# Patient Record
Sex: MALE | Race: WHITE | NOT HISPANIC OR LATINO | Employment: FULL TIME | URBAN - METROPOLITAN AREA
[De-identification: names, ages, dates, MRNs, and addresses within clinical notes are randomized per-mention and may not be internally consistent; named-entity substitution may affect disease eponyms.]

---

## 2018-05-18 ENCOUNTER — APPOINTMENT (OUTPATIENT)
Dept: RADIOLOGY | Facility: MEDICAL CENTER | Age: 53
End: 2018-05-18
Attending: INTERNAL MEDICINE
Payer: COMMERCIAL

## 2018-09-21 RX ORDER — BENAZEPRIL HYDROCHLORIDE AND HYDROCHLOROTHIAZIDE 20; 12.5 MG/1; MG/1
1 TABLET ORAL DAILY
Status: ON HOLD | COMMUNITY
End: 2022-04-13

## 2018-10-04 ENCOUNTER — HOSPITAL ENCOUNTER (OUTPATIENT)
Facility: MEDICAL CENTER | Age: 53
End: 2018-10-04
Attending: INTERNAL MEDICINE | Admitting: RADIOLOGY
Payer: COMMERCIAL

## 2018-10-04 ENCOUNTER — APPOINTMENT (OUTPATIENT)
Dept: RADIOLOGY | Facility: MEDICAL CENTER | Age: 53
End: 2018-10-04
Attending: INTERNAL MEDICINE
Payer: COMMERCIAL

## 2018-10-04 VITALS
DIASTOLIC BLOOD PRESSURE: 63 MMHG | HEIGHT: 68 IN | BODY MASS INDEX: 37.13 KG/M2 | HEART RATE: 66 BPM | RESPIRATION RATE: 15 BRPM | SYSTOLIC BLOOD PRESSURE: 119 MMHG | OXYGEN SATURATION: 99 % | WEIGHT: 245 LBS

## 2018-10-04 DIAGNOSIS — R94.5 NONSPECIFIC ABNORMAL RESULTS OF LIVER FUNCTION STUDY: ICD-10-CM

## 2018-10-04 DIAGNOSIS — K76.0 NONALCOHOLIC FATTY LIVER DISEASE: ICD-10-CM

## 2018-10-04 DIAGNOSIS — R77.8 ELEVATED TOTAL PROTEIN: ICD-10-CM

## 2018-10-04 DIAGNOSIS — R79.89 LIVER FUNCTION TEST ABNORMALITY: ICD-10-CM

## 2018-10-04 DIAGNOSIS — K76.0 FATTY METAMORPHOSIS OF LIVER: ICD-10-CM

## 2018-10-04 DIAGNOSIS — R79.89 HIGH SERUM FERRITIN: ICD-10-CM

## 2018-10-04 DIAGNOSIS — Z91.89 AT RISK FOR BLEEDING: ICD-10-CM

## 2018-10-04 PROCEDURE — 47000 NEEDLE BIOPSY OF LIVER PERQ: CPT

## 2018-10-04 PROCEDURE — 83540 ASSAY OF IRON: CPT

## 2018-10-04 PROCEDURE — 160002 HCHG RECOVERY MINUTES (STAT)

## 2018-10-04 PROCEDURE — 700101 HCHG RX REV CODE 250

## 2018-10-04 PROCEDURE — 88313 SPECIAL STAINS GROUP 2: CPT

## 2018-10-04 PROCEDURE — 700111 HCHG RX REV CODE 636 W/ 250 OVERRIDE (IP)

## 2018-10-04 PROCEDURE — 88307 TISSUE EXAM BY PATHOLOGIST: CPT

## 2018-10-04 RX ORDER — SODIUM CHLORIDE 9 MG/ML
500 INJECTION, SOLUTION INTRAVENOUS
Status: DISCONTINUED | OUTPATIENT
Start: 2018-10-04 | End: 2018-10-04 | Stop reason: HOSPADM

## 2018-10-04 RX ORDER — OXYCODONE HYDROCHLORIDE 5 MG/1
2.5 TABLET ORAL
Status: DISCONTINUED | OUTPATIENT
Start: 2018-10-04 | End: 2018-10-04 | Stop reason: HOSPADM

## 2018-10-04 RX ORDER — MIDAZOLAM HYDROCHLORIDE 1 MG/ML
.5-2 INJECTION INTRAMUSCULAR; INTRAVENOUS PRN
Status: DISCONTINUED | OUTPATIENT
Start: 2018-10-04 | End: 2018-10-04 | Stop reason: HOSPADM

## 2018-10-04 RX ORDER — MIDAZOLAM HYDROCHLORIDE 1 MG/ML
INJECTION INTRAMUSCULAR; INTRAVENOUS
Status: COMPLETED
Start: 2018-10-04 | End: 2018-10-04

## 2018-10-04 RX ORDER — LIDOCAINE HYDROCHLORIDE 20 MG/ML
INJECTION, SOLUTION INFILTRATION; PERINEURAL
Status: COMPLETED
Start: 2018-10-04 | End: 2018-10-04

## 2018-10-04 RX ORDER — OXYCODONE HYDROCHLORIDE 5 MG/1
5 TABLET ORAL
Status: DISCONTINUED | OUTPATIENT
Start: 2018-10-04 | End: 2018-10-04 | Stop reason: HOSPADM

## 2018-10-04 RX ORDER — ONDANSETRON 2 MG/ML
4 INJECTION INTRAMUSCULAR; INTRAVENOUS PRN
Status: DISCONTINUED | OUTPATIENT
Start: 2018-10-04 | End: 2018-10-04 | Stop reason: HOSPADM

## 2018-10-04 RX ADMIN — MIDAZOLAM 2 MG: 1 INJECTION INTRAMUSCULAR; INTRAVENOUS at 08:19

## 2018-10-04 RX ADMIN — MIDAZOLAM 2 MG: 1 INJECTION INTRAMUSCULAR; INTRAVENOUS at 08:54

## 2018-10-04 RX ADMIN — LIDOCAINE HYDROCHLORIDE: 20 INJECTION, SOLUTION INFILTRATION; PERINEURAL at 08:45

## 2018-10-04 RX ADMIN — FENTANYL CITRATE 50 MCG: 50 INJECTION, SOLUTION INTRAMUSCULAR; INTRAVENOUS at 08:52

## 2018-10-04 RX ADMIN — FENTANYL CITRATE 50 MCG: 50 INJECTION, SOLUTION INTRAMUSCULAR; INTRAVENOUS at 08:19

## 2018-10-04 RX ADMIN — MIDAZOLAM HYDROCHLORIDE 2 MG: 1 INJECTION INTRAMUSCULAR; INTRAVENOUS at 08:25

## 2018-10-04 RX ADMIN — MIDAZOLAM HYDROCHLORIDE 2 MG: 1 INJECTION INTRAMUSCULAR; INTRAVENOUS at 08:54

## 2018-10-04 RX ADMIN — MIDAZOLAM HYDROCHLORIDE 2 MG: 1 INJECTION INTRAMUSCULAR; INTRAVENOUS at 08:19

## 2018-10-04 ASSESSMENT — PAIN SCALES - GENERAL: PAINLEVEL_OUTOF10: 0

## 2018-10-04 NOTE — PROGRESS NOTES
OP IR RN note:    Site Marked and Confirmed with MD, patient and RN pre procedure   Liver BX by MD Sutton assisted by RT Aaron, Right abdomen access site;  x2 liver cores in formalinm  End tidal CO2 range 28-33 during procedure   Patient tolerated procedure, hemodynamically stable; pt awake and talking post procedure; see flow sheet for vitals and post op print out    patient in OP IR pod 1 via IR RN monitored          Pt to lay on right side for 1 hour with Q15 RN site checks then to lay on back for additional hour with site checks   x2 cores in [place] sent to lab

## 2018-10-04 NOTE — H&P
History and Physical    Date: 10/4/2018    PCP: Prakash Maldonado D.O.      CC: Abnormal liver function    HPI: This is a 52 y.o. male who is presenting Abnormal Liver function    Past Medical History:   Diagnosis Date   • Allergy     seasonal   • Hypertension        Past Surgical History:   Procedure Laterality Date   • TIBIAL OSTEOTOMY Left    • TONSILLECTOMY         Current Facility-Administered Medications   Medication Dose Route Frequency Provider Last Rate Last Dose   • LIDOCAINE HCL 2 % INJ SOLN            • MIDAZOLAM HCL 2 MG/2ML INJ SOLN            • FENTANYL CITRATE (PF) 0.05 MG/ML INJ SOLN                 Social History     Social History   • Marital status:      Spouse name: N/A   • Number of children: N/A   • Years of education: N/A     Occupational History   • Not on file.     Social History Main Topics   • Smoking status: Former Smoker     Types: Cigars     Quit date: 2014   • Smokeless tobacco: Former User     Types: Chew     Quit date: 1995   • Alcohol use 0.6 oz/week     1 Shots of liquor per week   • Drug use: No   • Sexual activity: Not on file     Other Topics Concern   • Not on file     Social History Narrative   • No narrative on file       Family History   Problem Relation Age of Onset   • Hypertension Mother        Allergies:  Patient has no known allergies.    Review of Systems:  Abnormal liver function    Physical Exam    Vital Signs                           Labs:                    Radiology:  IR-LIVER BX PROCEDURE    (Results Pending)             Assessment and Plan:This is a 52 y.o. Abnormal liver functio..    Plan: USG guided liver Bx

## 2018-10-04 NOTE — DISCHARGE INSTRUCTIONS
Discharge instructions reviewed with patient and family, all questions answered. IV removed and belongings returned.  Patient ambulated out in a stable condition.       ACTIVITY: Rest and take it easy for the first 24 hours.  A responsible adult is recommended to remain with you during that time.  It is normal to feel sleepy.  We encourage you to not do anything that requires balance, judgment or coordination.    MILD FLU-LIKE SYMPTOMS ARE NORMAL. YOU MAY EXPERIENCE GENERALIZED MUSCLE ACHES, THROAT IRRITATION, HEADACHE AND/OR SOME NAUSEA.    FOR 24 HOURS DO NOT:  Drive, operate machinery or run household appliances.  Drink beer or alcoholic beverages.   Make important decisions or sign legal documents.    SPECIAL INSTRUCTIONS:  Keep surgical site clean dry and covered until fully healed. Do not lift over 10 lbs for 2 week      DIET: To avoid nausea, slowly advance diet as tolerated, avoiding spicy or greasy foods for the first day.  Add more substantial food to your diet according to your physician's instructions.  Babies can be fed formula or breast milk as soon as they are hungry.  INCREASE FLUIDS AND FIBER TO AVOID CONSTIPATION.    SURGICAL DRESSING/BATHING: Keep surgical site clean dry and covered until fully healed.    FOLLOW-UP APPOINTMENT:  A follow-up appointment should be arranged with your doctor in 1 week; call to schedule.    You should CALL YOUR PHYSICIAN if you develop:  Fever greater than 101 degrees F.  Pain not relieved by medication, or persistent nausea or vomiting.  Excessive bleeding (blood soaking through dressing) or unexpected drainage from the wound.  Extreme redness or swelling around the incision site, drainage of pus or foul smelling drainage.  Inability to urinate or empty your bladder within 8 hours.  Problems with breathing or chest pain.    You should call 911 if you develop problems with breathing or chest pain.  If you are unable to contact your doctor or surgical center, you should  go to the nearest emergency room or urgent care center.     If any questions arise, call your doctor.  If your doctor is not available, please feel free to call the Surgical Center at [phone].  The Center is open Monday through Friday from 7AM to 7PM.  You can also call the HEALTH HOTLINE open 24 hours/day, 7 days/week and speak to a nurse at [phone], or toll free at [phone].    A registered nurse may call you a few days after your surgery to see how you are doing after your procedure.    MEDICATIONS: Resume taking daily medication.  Take prescribed pain medication with food.  If no medication is prescribed, you may take non-aspirin pain medication if needed.  PAIN MEDICATION CAN BE VERY CONSTIPATING.  Take a stool softener or laxative such as senokot, pericolace, or milk of magnesia if needed.      If your physician has prescribed pain medication that includes Acetaminophen (Tylenol), do not take additional Acetaminophen (Tylenol) while taking the prescribed medication.    Depression / Suicide Risk    As you are discharged from this [facility] facility, it is important to learn how to keep safe from harming yourself.    Recognize the warning signs:  · Abrupt changes in personality, positive or negative- including increase in energy   · Giving away possessions  · Change in eating patterns- significant weight changes-  positive or negative  · Change in sleeping patterns- unable to sleep or sleeping all the time   · Unwillingness or inability to communicate  · Depression  · Unusual sadness, discouragement and loneliness  · Talk of wanting to die  · Neglect of personal appearance   · Rebelliousness- reckless behavior  · Withdrawal from people/activities they love  · Confusion- inability to concentrate     If you or a loved one observes any of these behaviors or has concerns about self-harm, here's what you can do:  · Talk about it- your feelings and reasons for harming yourself  · Remove any  means that you might use to hurt yourself (examples: pills, rope, extension cords, firearm)  · Get professional help from the community (Mental Health, Substance Abuse, psychological counseling)  · Do not be alone:Call your Safe Contact- someone whom you trust who will be there for you.  · Call your local CRISIS HOTLINE 178-2479 or 545-799-7999  · Call your local Children's Mobile Crisis Response Team [place] (524) 808-2418 or wwwInstagarage  · Call the toll free National Suicide Prevention Hotlines   · National Suicide Prevention Lifeline 214-782-MIBI (0569)  · National Hope Line Network 800-SUICIDE (959-3639)      Liver Biopsy, Care After  Refer to this sheet in the next few weeks. These instructions provide you with information on caring for yourself after your procedure. Your health care provider may also give you more specific instructions. Your treatment has been planned according to current medical practices, but problems sometimes occur. Call your health care provider if you have any problems or questions after your procedure.  WHAT TO EXPECT AFTER THE PROCEDURE  After your procedure, it is typical to have the following:  · A small amount of discomfort in the area where the biopsy was done and in the right shoulder or shoulder blade.  · A small amount of bruising around the area where the biopsy was done and on the skin over the liver.  · Sleepiness and fatigue for the rest of the day.  HOME CARE INSTRUCTIONS   · Rest at home for 1-2 days or as directed by your health care provider.  · Have a friend or family member stay with you for at least 24 hours.  · Because of the medicines used during the procedure, you should not do the following things in the first 24 hours:  ¨ Drive.  ¨ Use machinery.  ¨ Be responsible for the care of other people.  ¨ Sign legal documents.  ¨ Take a bath or shower.  · There are many different ways to close and cover an incision, including stitches, skin glue, and  adhesive strips. Follow your health care provider's instructions on:  ¨ Incision care.  ¨ Bandage (dressing) changes and removal.  ¨ Incision closure removal.  · Do not drink alcohol in the first week.  · Do not lift more than 5 pounds or play contact sports for 2 weeks after this test.  · Take medicines only as directed by your health care provider. Do not take medicine containing aspirin or non-steroidal anti-inflammatory medicines such as ibuprofen for 1 week after this test.  · It is your responsibility to get your test results.  SEEK MEDICAL CARE IF:   · You have increased bleeding from an incision that results in more than a small spot of blood.  · You have redness, swelling, or increasing pain in any incisions.  · You notice a discharge or a bad smell coming from any of your incisions.  · You have a fever or chills.  SEEK IMMEDIATE MEDICAL CARE IF:   · You develop swelling, bloating, or pain in your abdomen.  · You become dizzy or faint.  · You develop a rash.  · You are nauseous or vomit.  · You have difficulty breathing, feel short of breath, or feel faint.  · You develop chest pain.  · You have problems with your speech or vision.  · You have trouble balancing or moving your arms or legs.     This information is not intended to replace advice given to you by your health care provider. Make sure you discuss any questions you have with your health care provider.     Document Released: 07/07/2006 Document Revised: 01/08/2016 Document Reviewed: 02/13/2015  [employer] Interactive Patient Education ©2016 [employer]      Liver Biopsy  The liver is a large organ in the upper right-hand side of your abdomen. A liver biopsy is a procedure in which a tissue sample is taken from the liver and examined under a microscope. The procedure is done to confirm a suspected problem.  There are three types of liver biopsies:  · Percutaneous. In this type, an incision is made in your abdomen. The sample is removed through the  incision with a needle.  · Laparoscopic. In this type, several incisions are made in the abdomen. A tiny camera is passed through one of the incisions to help guide the health care provider. The sample is removed through the other incision or incisions.  · Transjugular. In this type, an incision is made in the neck. A tube is passed through the incision to the liver. The sample is removed through the tube with a needle.  Tell a health care provider about:  · Any allergies you have.  · All medicines you are taking, including vitamins, herbs, eye drops, creams, and over-the-counter medicines.  · Any problems you or family members have had with anesthetic medicines.  · Any blood disorders you have.  · Any surgeries you have had.  · Any medical conditions you have.  · Possibility of pregnancy, if this applies.  What are the risks?  Generally, this is a safe procedure. However, problems can occur and include:  · Bleeding.  · Infection.  · Bruising.  · Collapsed lung.  · Leak of digestive juices (bile) from the liver or gallbladder.  · Problems with heart rhythm.  · Pain at the biopsy site or in the right shoulder.  · Low blood pressure (hypotension).  · Injury to nearby organs or tissues.  What happens before the procedure?  · Your health care provider may do some blood or urine tests. These will help your health care provider learn how well your kidneys and liver are working and how well your blood clots.  · Ask your health care provider if you will be able to go home the day of the procedure. Arrange for someone to take you home and stay with you for at least 24 hours.  · Do not eat or drink anything after midnight on the night before the procedure or as directed by your health care provider.  · Ask your health care provider about:  ¨ Changing or stopping your regular medicines. This is especially important if you are taking diabetes medicines or blood thinners.  ¨ Taking medicines such as aspirin and ibuprofen. These  medicines can thin your blood. Do not take these medicines before your procedure if your health care provider asks you not to.  What happens during the procedure?  Regardless of the type of biopsy that will be done, you will have an IV line placed. Through this line, you will receive fluids and medicine to relax you. If you will be having a laparoscopic biopsy, you may also receive medicine through this line to make you sleep during the procedure (general anesthetic).  Percutaneous Liver Biopsy  · You will positioned on your back, with your right hand over your head.  · A health care provider will locate your liver by tapping and pressing on the right side of your abdomen or with the help of an ultrasound machine or CT scan.  · An area at the bottom of your last right rib will be numbed.  · An incision will be made in the numbed area.  · The biopsy needle will be inserted into the incision.  · Several samples of liver tissue will be taken with the biopsy needle. You will be asked to hold your breath as each sample is taken.  Laparoscopic Liver Biopsy  · You will be positioned on your back.  · Several small incisions will be made in your abdomen.  · Your doctor will pass a tiny camera through one incision. The camera will allow the liver to be viewed on a TV monitor in the operating room.  · Tools will be passed through the other incision or incisions. These tools will be used to remove samples of liver tissue.  Transjugular Liver Biopsy  · You will be positioned on your back on an X-ray table, with your head turned to your left.  · An area on your neck just over your jugular vein will be numbed.  · An incision will be made in the numbed area.  · A tiny tube will be inserted through the incision. It will be pushed through the jugular vein to a blood vessel in the liver called the hepatic vein.  · Dye will be inserted through the tube, and X-rays will be taken. The dye will make the blood vessels in the liver light up  on the X-rays.  · The biopsy needle will be pushed through the tube until it reaches the liver.  · Samples of liver tissue will be taken with the biopsy needle.  · The needle and the tube will be removed.  After the samples are obtained, the incision or incisions will be closed.  What happens after the procedure?  · You will be taken to a recovery area.  · You may have to lie on your right side for 1-2 hours. This will prevent bleeding from the biopsy site.  · Your progress will be watched. Your blood pressure, pulse, and the biopsy site will be checked often.  · You may have some pain or feel sick. If this happens, tell your health care provider.  · As you begin to feel better, you will be offered ice and beverages.  · You may be allowed to go home when the medicines have worn off and you can walk, drink, eat, and use the bathroom.  This information is not intended to replace advice given to you by your health care provider. Make sure you discuss any questions you have with your health care provider.  Document Released: 03/09/2005 Document Revised: 05/22/2017 Document Reviewed: 02/13/2015  [employer] Interactive Patient Education © 2017 [employer]

## 2018-10-04 NOTE — OR SURGEON
Immediate Post- Operative Note        PostOp Diagnosis: Abnormal liver functions      Procedure(s): USG guided right Liver Bx      Estimated Blood Loss: Less than 5 ml        Complications: None            10/4/2018     8:56 AM     Calvin Sutton

## 2018-10-04 NOTE — PROGRESS NOTES
Discharge instructions reviewed with patient and family, all questions answered. IV removed and belongings returned.  Patient ambulated out in a stable condition.       Discharge instructions reviewed with patient and family, all questions answered. IV removed and belongings returned.  Patient ambulated out in a stable condition.       ACTIVITY: Rest and take it easy for the first 24 hours.  A responsible adult is recommended to remain with you during that time.  It is normal to feel sleepy.  We encourage you to not do anything that requires balance, judgment or coordination.    MILD FLU-LIKE SYMPTOMS ARE NORMAL. YOU MAY EXPERIENCE GENERALIZED MUSCLE ACHES, THROAT IRRITATION, HEADACHE AND/OR SOME NAUSEA.    FOR 24 HOURS DO NOT:  Drive, operate machinery or run household appliances.  Drink beer or alcoholic beverages.   Make important decisions or sign legal documents.    SPECIAL INSTRUCTIONS:  Keep surgical site clean dry and covered until fully healed. Do not lift over 10 lbs for 2 week      DIET: To avoid nausea, slowly advance diet as tolerated, avoiding spicy or greasy foods for the first day.  Add more substantial food to your diet according to your physician's instructions.  Babies can be fed formula or breast milk as soon as they are hungry.  INCREASE FLUIDS AND FIBER TO AVOID CONSTIPATION.    SURGICAL DRESSING/BATHING: Keep surgical site clean dry and covered until fully healed.    FOLLOW-UP APPOINTMENT:  A follow-up appointment should be arranged with your doctor in 1 week; call to schedule.    You should CALL YOUR PHYSICIAN if you develop:  Fever greater than 101 degrees F.  Pain not relieved by medication, or persistent nausea or vomiting.  Excessive bleeding (blood soaking through dressing) or unexpected drainage from the wound.  Extreme redness or swelling around the incision site, drainage of pus or foul smelling drainage.  Inability to urinate or empty your bladder within 8 hours.  Problems with  breathing or chest pain.    You should call 911 if you develop problems with breathing or chest pain.  If you are unable to contact your doctor or surgical center, you should go to the nearest emergency room or urgent care center.     If any questions arise, call your doctor.  If your doctor is not available, please feel free to call the Surgical Center at [phone].  The Center is open Monday through Friday from 7AM to 7PM.  You can also call the HEALTH HOTLINE open 24 hours/day, 7 days/week and speak to a nurse at [phone], or toll free at [phone].    A registered nurse may call you a few days after your surgery to see how you are doing after your procedure.    MEDICATIONS: Resume taking daily medication.  Take prescribed pain medication with food.  If no medication is prescribed, you may take non-aspirin pain medication if needed.  PAIN MEDICATION CAN BE VERY CONSTIPATING.  Take a stool softener or laxative such as senokot, pericolace, or milk of magnesia if needed.      If your physician has prescribed pain medication that includes Acetaminophen (Tylenol), do not take additional Acetaminophen (Tylenol) while taking the prescribed medication.    Depression / Suicide Risk    As you are discharged from this [facility] facility, it is important to learn how to keep safe from harming yourself.    Recognize the warning signs:  · Abrupt changes in personality, positive or negative- including increase in energy   · Giving away possessions  · Change in eating patterns- significant weight changes-  positive or negative  · Change in sleeping patterns- unable to sleep or sleeping all the time   · Unwillingness or inability to communicate  · Depression  · Unusual sadness, discouragement and loneliness  · Talk of wanting to die  · Neglect of personal appearance   · Rebelliousness- reckless behavior  · Withdrawal from people/activities they love  · Confusion- inability to concentrate     If you or a loved  one observes any of these behaviors or has concerns about self-harm, here's what you can do:  · Talk about it- your feelings and reasons for harming yourself  · Remove any means that you might use to hurt yourself (examples: pills, rope, extension cords, firearm)  · Get professional help from the community (Mental Health, Substance Abuse, psychological counseling)  · Do not be alone:Call your Safe Contact- someone whom you trust who will be there for you.  · Call your local CRISIS HOTLINE 442-4946 or 976-068-7780  · Call your local Children's Mobile Crisis Response Team [place] (597) 039-0248 or www.[employer]  · Call the toll free National Suicide Prevention Hotlines   · National Suicide Prevention Lifeline 894-534-QYFQ (6401)  · [place] scrible Line Network 800-SUICIDE (562-1116)      Liver Biopsy, Care After  Refer to this sheet in the next few weeks. These instructions provide you with information on caring for yourself after your procedure. Your health care provider may also give you more specific instructions. Your treatment has been planned according to current medical practices, but problems sometimes occur. Call your health care provider if you have any problems or questions after your procedure.  WHAT TO EXPECT AFTER THE PROCEDURE  After your procedure, it is typical to have the following:  · A small amount of discomfort in the area where the biopsy was done and in the right shoulder or shoulder blade.  · A small amount of bruising around the area where the biopsy was done and on the skin over the liver.  · Sleepiness and fatigue for the rest of the day.  HOME CARE INSTRUCTIONS   · Rest at home for 1-2 days or as directed by your health care provider.  · Have a friend or family member stay with you for at least 24 hours.  · Because of the medicines used during the procedure, you should not do the following things in the first 24 hours:  ¨ Drive.  ¨ Use machinery.  ¨ Be responsible for the care of  other people.  ¨ Sign legal documents.  ¨ Take a bath or shower.  · There are many different ways to close and cover an incision, including stitches, skin glue, and adhesive strips. Follow your health care provider's instructions on:  ¨ Incision care.  ¨ Bandage (dressing) changes and removal.  ¨ Incision closure removal.  · Do not drink alcohol in the first week.  · Do not lift more than 5 pounds or play contact sports for 2 weeks after this test.  · Take medicines only as directed by your health care provider. Do not take medicine containing aspirin or non-steroidal anti-inflammatory medicines such as ibuprofen for 1 week after this test.  · It is your responsibility to get your test results.  SEEK MEDICAL CARE IF:   · You have increased bleeding from an incision that results in more than a small spot of blood.  · You have redness, swelling, or increasing pain in any incisions.  · You notice a discharge or a bad smell coming from any of your incisions.  · You have a fever or chills.  SEEK IMMEDIATE MEDICAL CARE IF:   · You develop swelling, bloating, or pain in your abdomen.  · You become dizzy or faint.  · You develop a rash.  · You are nauseous or vomit.  · You have difficulty breathing, feel short of breath, or feel faint.  · You develop chest pain.  · You have problems with your speech or vision.  · You have trouble balancing or moving your arms or legs.     This information is not intended to replace advice given to you by your health care provider. Make sure you discuss any questions you have with your health care provider.     Document Released: 07/07/2006 Document Revised: 01/08/2016 Document Reviewed: 02/13/2015  [employer] Interactive Patient Education ©2016 [employer]      Liver Biopsy  The liver is a large organ in the upper right-hand side of your abdomen. A liver biopsy is a procedure in which a tissue sample is taken from the liver and examined under a microscope. The procedure is done to confirm a  suspected problem.  There are three types of liver biopsies:  · Percutaneous. In this type, an incision is made in your abdomen. The sample is removed through the incision with a needle.  · Laparoscopic. In this type, several incisions are made in the abdomen. A tiny camera is passed through one of the incisions to help guide the health care provider. The sample is removed through the other incision or incisions.  · Transjugular. In this type, an incision is made in the neck. A tube is passed through the incision to the liver. The sample is removed through the tube with a needle.  Tell a health care provider about:  · Any allergies you have.  · All medicines you are taking, including vitamins, herbs, eye drops, creams, and over-the-counter medicines.  · Any problems you or family members have had with anesthetic medicines.  · Any blood disorders you have.  · Any surgeries you have had.  · Any medical conditions you have.  · Possibility of pregnancy, if this applies.  What are the risks?  Generally, this is a safe procedure. However, problems can occur and include:  · Bleeding.  · Infection.  · Bruising.  · Collapsed lung.  · Leak of digestive juices (bile) from the liver or gallbladder.  · Problems with heart rhythm.  · Pain at the biopsy site or in the right shoulder.  · Low blood pressure (hypotension).  · Injury to nearby organs or tissues.  What happens before the procedure?  · Your health care provider may do some blood or urine tests. These will help your health care provider learn how well your kidneys and liver are working and how well your blood clots.  · Ask your health care provider if you will be able to go home the day of the procedure. Arrange for someone to take you home and stay with you for at least 24 hours.  · Do not eat or drink anything after midnight on the night before the procedure or as directed by your health care provider.  · Ask your health care provider about:  ¨ Changing or stopping  your regular medicines. This is especially important if you are taking diabetes medicines or blood thinners.  ¨ Taking medicines such as aspirin and ibuprofen. These medicines can thin your blood. Do not take these medicines before your procedure if your health care provider asks you not to.  What happens during the procedure?  Regardless of the type of biopsy that will be done, you will have an IV line placed. Through this line, you will receive fluids and medicine to relax you. If you will be having a laparoscopic biopsy, you may also receive medicine through this line to make you sleep during the procedure (general anesthetic).  Percutaneous Liver Biopsy  · You will positioned on your back, with your right hand over your head.  · A health care provider will locate your liver by tapping and pressing on the right side of your abdomen or with the help of an ultrasound machine or CT scan.  · An area at the bottom of your last right rib will be numbed.  · An incision will be made in the numbed area.  · The biopsy needle will be inserted into the incision.  · Several samples of liver tissue will be taken with the biopsy needle. You will be asked to hold your breath as each sample is taken.  Laparoscopic Liver Biopsy  · You will be positioned on your back.  · Several small incisions will be made in your abdomen.  · Your doctor will pass a tiny camera through one incision. The camera will allow the liver to be viewed on a TV monitor in the operating room.  · Tools will be passed through the other incision or incisions. These tools will be used to remove samples of liver tissue.  Transjugular Liver Biopsy  · You will be positioned on your back on an X-ray table, with your head turned to your left.  · An area on your neck just over your jugular vein will be numbed.  · An incision will be made in the numbed area.  · A tiny tube will be inserted through the incision. It will be pushed through the jugular vein to a blood  vessel in the liver called the hepatic vein.  · Dye will be inserted through the tube, and X-rays will be taken. The dye will make the blood vessels in the liver light up on the X-rays.  · The biopsy needle will be pushed through the tube until it reaches the liver.  · Samples of liver tissue will be taken with the biopsy needle.  · The needle and the tube will be removed.  After the samples are obtained, the incision or incisions will be closed.  What happens after the procedure?  · You will be taken to a recovery area.  · You may have to lie on your right side for 1-2 hours. This will prevent bleeding from the biopsy site.  · Your progress will be watched. Your blood pressure, pulse, and the biopsy site will be checked often.  · You may have some pain or feel sick. If this happens, tell your health care provider.  · As you begin to feel better, you will be offered ice and beverages.  · You may be allowed to go home when the medicines have worn off and you can walk, drink, eat, and use the bathroom.  This information is not intended to replace advice given to you by your health care provider. Make sure you discuss any questions you have with your health care provider.  Document Released: 03/09/2005 Document Revised: 05/22/2017 Document Reviewed: 02/13/2015  [employer] Interactive Patient Education © 2017 [employer]

## 2018-10-14 LAB — TEST NAME 95000: ABNORMAL

## 2019-05-13 ENCOUNTER — HOSPITAL ENCOUNTER (OUTPATIENT)
Facility: MEDICAL CENTER | Age: 54
End: 2019-05-13
Attending: SURGERY | Admitting: SURGERY
Payer: COMMERCIAL

## 2022-04-08 ENCOUNTER — PRE-ADMISSION TESTING (OUTPATIENT)
Dept: ADMISSIONS | Facility: MEDICAL CENTER | Age: 57
End: 2022-04-08
Attending: SURGERY
Payer: COMMERCIAL

## 2022-04-08 NOTE — OR NURSING
Preadmit: Pt called to inquire about doing testing day of procedure due to living in [place] and not having access to lab services. Patient states MD told patient to check with surgery department.   Due to patient circumstances, patient was informed that it will be ok to draw lab testing day of surgery.

## 2022-04-12 NOTE — OR NURSING
COVID-19 Pre-surgery screening:     ?     Do you have an undiagnosed respiratory illness or symptoms such as coughing or sneezing? No  Onset of Sx n/a  Acute vs. chronic respiratory illness  n/a     Do you have an unexplained fever greater than 100.4 degrees Fahrenheit or 38 degrees Celsius? No  ?  Have you had direct exposure to a patient who tested positive for Covid-19? No     Have you had any loss of your sense of taste or smell, N/V or sore throat? No           Patient has been informed of 2 adult visitor policy and asked to wear a mask at all times.  Yes

## 2022-04-13 ENCOUNTER — HOSPITAL ENCOUNTER (OUTPATIENT)
Facility: MEDICAL CENTER | Age: 57
End: 2022-04-13
Attending: SURGERY | Admitting: SURGERY
Payer: COMMERCIAL

## 2022-04-13 ENCOUNTER — ANESTHESIA (OUTPATIENT)
Dept: SURGERY | Facility: MEDICAL CENTER | Age: 57
End: 2022-04-13
Payer: COMMERCIAL

## 2022-04-13 ENCOUNTER — ANESTHESIA EVENT (OUTPATIENT)
Dept: SURGERY | Facility: MEDICAL CENTER | Age: 57
End: 2022-04-13
Payer: COMMERCIAL

## 2022-04-13 VITALS
SYSTOLIC BLOOD PRESSURE: 133 MMHG | RESPIRATION RATE: 16 BRPM | TEMPERATURE: 97.8 F | HEART RATE: 86 BPM | WEIGHT: 234.13 LBS | BODY MASS INDEX: 34.68 KG/M2 | DIASTOLIC BLOOD PRESSURE: 72 MMHG | OXYGEN SATURATION: 91 % | HEIGHT: 69 IN

## 2022-04-13 DIAGNOSIS — K42.9 UMBILICAL HERNIA WITHOUT OBSTRUCTION OR GANGRENE: ICD-10-CM

## 2022-04-13 LAB
ALBUMIN SERPL BCP-MCNC: 4.8 G/DL (ref 3.2–4.9)
ALBUMIN/GLOB SERPL: 2.2 G/DL
ALP SERPL-CCNC: 69 U/L (ref 30–99)
ALT SERPL-CCNC: 25 U/L (ref 2–50)
ANION GAP SERPL CALC-SCNC: 12 MMOL/L (ref 7–16)
AST SERPL-CCNC: 14 U/L (ref 12–45)
BILIRUB SERPL-MCNC: 1.3 MG/DL (ref 0.1–1.5)
BUN SERPL-MCNC: 15 MG/DL (ref 8–22)
CALCIUM SERPL-MCNC: 9.2 MG/DL (ref 8.5–10.5)
CHLORIDE SERPL-SCNC: 104 MMOL/L (ref 96–112)
CO2 SERPL-SCNC: 24 MMOL/L (ref 20–33)
CREAT SERPL-MCNC: 0.8 MG/DL (ref 0.5–1.4)
ERYTHROCYTE [DISTWIDTH] IN BLOOD BY AUTOMATED COUNT: 39.4 FL (ref 35.9–50)
GFR SERPLBLD CREATININE-BSD FMLA CKD-EPI: 103 ML/MIN/1.73 M 2
GLOBULIN SER CALC-MCNC: 2.2 G/DL (ref 1.9–3.5)
GLUCOSE SERPL-MCNC: 146 MG/DL (ref 65–99)
HCT VFR BLD AUTO: 48.8 % (ref 42–52)
HGB BLD-MCNC: 17.1 G/DL (ref 14–18)
INR PPP: 1.2 (ref 0.87–1.13)
MCH RBC QN AUTO: 31.5 PG (ref 27–33)
MCHC RBC AUTO-ENTMCNC: 35 G/DL (ref 33.7–35.3)
MCV RBC AUTO: 90 FL (ref 81.4–97.8)
PLATELET # BLD AUTO: 215 K/UL (ref 164–446)
PMV BLD AUTO: 9.6 FL (ref 9–12.9)
POTASSIUM SERPL-SCNC: 4.4 MMOL/L (ref 3.6–5.5)
PROT SERPL-MCNC: 7 G/DL (ref 6–8.2)
PROTHROMBIN TIME: 14.8 SEC (ref 12–14.6)
RBC # BLD AUTO: 5.42 M/UL (ref 4.7–6.1)
SODIUM SERPL-SCNC: 140 MMOL/L (ref 135–145)
WBC # BLD AUTO: 5.2 K/UL (ref 4.8–10.8)

## 2022-04-13 PROCEDURE — 502714 HCHG ROBOTIC SURGERY SERVICES: Performed by: SURGERY

## 2022-04-13 PROCEDURE — 700111 HCHG RX REV CODE 636 W/ 250 OVERRIDE (IP): Performed by: ANESTHESIOLOGY

## 2022-04-13 PROCEDURE — 160031 HCHG SURGERY MINUTES - 1ST 30 MINS LEVEL 5: Performed by: SURGERY

## 2022-04-13 PROCEDURE — 160035 HCHG PACU - 1ST 60 MINS PHASE I: Performed by: SURGERY

## 2022-04-13 PROCEDURE — 160036 HCHG PACU - EA ADDL 30 MINS PHASE I: Performed by: SURGERY

## 2022-04-13 PROCEDURE — 700105 HCHG RX REV CODE 258: Performed by: ANESTHESIOLOGY

## 2022-04-13 PROCEDURE — 160042 HCHG SURGERY MINUTES - EA ADDL 1 MIN LEVEL 5: Performed by: SURGERY

## 2022-04-13 PROCEDURE — 160046 HCHG PACU - 1ST 60 MINS PHASE II: Performed by: SURGERY

## 2022-04-13 PROCEDURE — 700101 HCHG RX REV CODE 250: Performed by: SURGERY

## 2022-04-13 PROCEDURE — 00790 ANES IPER UPR ABD NOS: CPT | Performed by: ANESTHESIOLOGY

## 2022-04-13 PROCEDURE — 500868 HCHG NEEDLE, SURGI(VARES): Performed by: SURGERY

## 2022-04-13 PROCEDURE — 700105 HCHG RX REV CODE 258: Performed by: SURGERY

## 2022-04-13 PROCEDURE — 160002 HCHG RECOVERY MINUTES (STAT): Performed by: SURGERY

## 2022-04-13 PROCEDURE — 160025 RECOVERY II MINUTES (STATS): Performed by: SURGERY

## 2022-04-13 PROCEDURE — C1781 MESH (IMPLANTABLE): HCPCS | Performed by: SURGERY

## 2022-04-13 PROCEDURE — 160009 HCHG ANES TIME/MIN: Performed by: SURGERY

## 2022-04-13 PROCEDURE — 85027 COMPLETE CBC AUTOMATED: CPT

## 2022-04-13 PROCEDURE — 700101 HCHG RX REV CODE 250: Performed by: ANESTHESIOLOGY

## 2022-04-13 PROCEDURE — 700102 HCHG RX REV CODE 250 W/ 637 OVERRIDE(OP): Performed by: ANESTHESIOLOGY

## 2022-04-13 PROCEDURE — A9270 NON-COVERED ITEM OR SERVICE: HCPCS | Performed by: ANESTHESIOLOGY

## 2022-04-13 PROCEDURE — 85610 PROTHROMBIN TIME: CPT

## 2022-04-13 PROCEDURE — 500002 HCHG ADHESIVE, DERMABOND: Performed by: SURGERY

## 2022-04-13 PROCEDURE — 80053 COMPREHEN METABOLIC PANEL: CPT

## 2022-04-13 PROCEDURE — 501838 HCHG SUTURE GENERAL: Performed by: SURGERY

## 2022-04-13 PROCEDURE — 700111 HCHG RX REV CODE 636 W/ 250 OVERRIDE (IP): Performed by: SURGERY

## 2022-04-13 PROCEDURE — 160048 HCHG OR STATISTICAL LEVEL 1-5: Performed by: SURGERY

## 2022-04-13 DEVICE — MESH PROGRIP LAPROSCOPIC SELF FIXATING (1/CA): Type: IMPLANTABLE DEVICE | Status: FUNCTIONAL

## 2022-04-13 RX ORDER — HALOPERIDOL 5 MG/ML
1 INJECTION INTRAMUSCULAR
Status: DISCONTINUED | OUTPATIENT
Start: 2022-04-13 | End: 2022-04-13 | Stop reason: HOSPADM

## 2022-04-13 RX ORDER — SODIUM CHLORIDE, SODIUM LACTATE, POTASSIUM CHLORIDE, CALCIUM CHLORIDE 600; 310; 30; 20 MG/100ML; MG/100ML; MG/100ML; MG/100ML
INJECTION, SOLUTION INTRAVENOUS CONTINUOUS
Status: DISCONTINUED | OUTPATIENT
Start: 2022-04-13 | End: 2022-04-13 | Stop reason: HOSPADM

## 2022-04-13 RX ORDER — HYDROMORPHONE HYDROCHLORIDE 1 MG/ML
0.2 INJECTION, SOLUTION INTRAMUSCULAR; INTRAVENOUS; SUBCUTANEOUS
Status: DISCONTINUED | OUTPATIENT
Start: 2022-04-13 | End: 2022-04-13 | Stop reason: HOSPADM

## 2022-04-13 RX ORDER — KETOROLAC TROMETHAMINE 30 MG/ML
INJECTION, SOLUTION INTRAMUSCULAR; INTRAVENOUS PRN
Status: DISCONTINUED | OUTPATIENT
Start: 2022-04-13 | End: 2022-04-13 | Stop reason: SURG

## 2022-04-13 RX ORDER — ACETAMINOPHEN 500 MG
1000 TABLET ORAL EVERY 6 HOURS
Qty: 56 TABLET | Refills: 0 | Status: SHIPPED | OUTPATIENT
Start: 2022-04-13 | End: 2022-04-20

## 2022-04-13 RX ORDER — HYDROMORPHONE HYDROCHLORIDE 1 MG/ML
0.1 INJECTION, SOLUTION INTRAMUSCULAR; INTRAVENOUS; SUBCUTANEOUS
Status: DISCONTINUED | OUTPATIENT
Start: 2022-04-13 | End: 2022-04-13 | Stop reason: HOSPADM

## 2022-04-13 RX ORDER — SODIUM CHLORIDE, SODIUM LACTATE, POTASSIUM CHLORIDE, CALCIUM CHLORIDE 600; 310; 30; 20 MG/100ML; MG/100ML; MG/100ML; MG/100ML
INJECTION, SOLUTION INTRAVENOUS CONTINUOUS
Status: ACTIVE | OUTPATIENT
Start: 2022-04-13 | End: 2022-04-13

## 2022-04-13 RX ORDER — CEFAZOLIN SODIUM 1 G/3ML
INJECTION, POWDER, FOR SOLUTION INTRAMUSCULAR; INTRAVENOUS PRN
Status: DISCONTINUED | OUTPATIENT
Start: 2022-04-13 | End: 2022-04-13 | Stop reason: SURG

## 2022-04-13 RX ORDER — HYDRALAZINE HYDROCHLORIDE 20 MG/ML
INJECTION INTRAMUSCULAR; INTRAVENOUS PRN
Status: DISCONTINUED | OUTPATIENT
Start: 2022-04-13 | End: 2022-04-13 | Stop reason: SURG

## 2022-04-13 RX ORDER — HYDRALAZINE HYDROCHLORIDE 20 MG/ML
5 INJECTION INTRAMUSCULAR; INTRAVENOUS
Status: DISCONTINUED | OUTPATIENT
Start: 2022-04-13 | End: 2022-04-13 | Stop reason: HOSPADM

## 2022-04-13 RX ORDER — LIDOCAINE HYDROCHLORIDE 20 MG/ML
INJECTION, SOLUTION EPIDURAL; INFILTRATION; INTRACAUDAL; PERINEURAL PRN
Status: DISCONTINUED | OUTPATIENT
Start: 2022-04-13 | End: 2022-04-13 | Stop reason: SURG

## 2022-04-13 RX ORDER — MEPERIDINE HYDROCHLORIDE 25 MG/ML
12.5 INJECTION INTRAMUSCULAR; INTRAVENOUS; SUBCUTANEOUS
Status: DISCONTINUED | OUTPATIENT
Start: 2022-04-13 | End: 2022-04-13 | Stop reason: HOSPADM

## 2022-04-13 RX ORDER — ONDANSETRON 2 MG/ML
INJECTION INTRAMUSCULAR; INTRAVENOUS PRN
Status: DISCONTINUED | OUTPATIENT
Start: 2022-04-13 | End: 2022-04-13 | Stop reason: SURG

## 2022-04-13 RX ORDER — IBUPROFEN 800 MG/1
800 TABLET ORAL
Qty: 21 TABLET | Refills: 0 | Status: SHIPPED | OUTPATIENT
Start: 2022-04-13 | End: 2022-04-20

## 2022-04-13 RX ORDER — ROCURONIUM BROMIDE 10 MG/ML
INJECTION, SOLUTION INTRAVENOUS PRN
Status: DISCONTINUED | OUTPATIENT
Start: 2022-04-13 | End: 2022-04-13 | Stop reason: SURG

## 2022-04-13 RX ORDER — LABETALOL HYDROCHLORIDE 5 MG/ML
5 INJECTION, SOLUTION INTRAVENOUS
Status: DISCONTINUED | OUTPATIENT
Start: 2022-04-13 | End: 2022-04-13 | Stop reason: HOSPADM

## 2022-04-13 RX ORDER — ONDANSETRON 2 MG/ML
4 INJECTION INTRAMUSCULAR; INTRAVENOUS
Status: DISCONTINUED | OUTPATIENT
Start: 2022-04-13 | End: 2022-04-13 | Stop reason: HOSPADM

## 2022-04-13 RX ORDER — DIPHENHYDRAMINE HYDROCHLORIDE 50 MG/ML
12.5 INJECTION INTRAMUSCULAR; INTRAVENOUS
Status: DISCONTINUED | OUTPATIENT
Start: 2022-04-13 | End: 2022-04-13 | Stop reason: HOSPADM

## 2022-04-13 RX ORDER — SODIUM CHLORIDE, SODIUM LACTATE, POTASSIUM CHLORIDE, CALCIUM CHLORIDE 600; 310; 30; 20 MG/100ML; MG/100ML; MG/100ML; MG/100ML
INJECTION, SOLUTION INTRAVENOUS
Status: DISCONTINUED | OUTPATIENT
Start: 2022-04-13 | End: 2022-04-13 | Stop reason: SURG

## 2022-04-13 RX ORDER — BENAZEPRIL HYDROCHLORIDE 10 MG/1
10 TABLET ORAL DAILY
COMMUNITY
Start: 2022-03-20

## 2022-04-13 RX ORDER — DEXAMETHASONE SODIUM PHOSPHATE 4 MG/ML
INJECTION, SOLUTION INTRA-ARTICULAR; INTRALESIONAL; INTRAMUSCULAR; INTRAVENOUS; SOFT TISSUE PRN
Status: DISCONTINUED | OUTPATIENT
Start: 2022-04-13 | End: 2022-04-13 | Stop reason: SURG

## 2022-04-13 RX ORDER — HYDROMORPHONE HYDROCHLORIDE 1 MG/ML
0.4 INJECTION, SOLUTION INTRAMUSCULAR; INTRAVENOUS; SUBCUTANEOUS
Status: DISCONTINUED | OUTPATIENT
Start: 2022-04-13 | End: 2022-04-13 | Stop reason: HOSPADM

## 2022-04-13 RX ORDER — BUPIVACAINE HYDROCHLORIDE AND EPINEPHRINE 5; 5 MG/ML; UG/ML
INJECTION, SOLUTION EPIDURAL; INTRACAUDAL; PERINEURAL
Status: DISCONTINUED | OUTPATIENT
Start: 2022-04-13 | End: 2022-04-13 | Stop reason: HOSPADM

## 2022-04-13 RX ORDER — OXYCODONE HCL 5 MG/5 ML
10 SOLUTION, ORAL ORAL
Status: COMPLETED | OUTPATIENT
Start: 2022-04-13 | End: 2022-04-13

## 2022-04-13 RX ORDER — OXYCODONE HCL 5 MG/5 ML
5 SOLUTION, ORAL ORAL
Status: COMPLETED | OUTPATIENT
Start: 2022-04-13 | End: 2022-04-13

## 2022-04-13 RX ADMIN — SODIUM CHLORIDE, POTASSIUM CHLORIDE, SODIUM LACTATE AND CALCIUM CHLORIDE: 600; 310; 30; 20 INJECTION, SOLUTION INTRAVENOUS at 08:32

## 2022-04-13 RX ADMIN — LIDOCAINE HYDROCHLORIDE 100 MG: 20 INJECTION, SOLUTION EPIDURAL; INFILTRATION; INTRACAUDAL at 08:55

## 2022-04-13 RX ADMIN — FENTANYL CITRATE 100 MCG: 50 INJECTION, SOLUTION INTRAMUSCULAR; INTRAVENOUS at 09:06

## 2022-04-13 RX ADMIN — KETOROLAC TROMETHAMINE 30 MG: 30 INJECTION, SOLUTION INTRAMUSCULAR at 09:33

## 2022-04-13 RX ADMIN — METHOCARBAMOL 1000 MG: 100 INJECTION INTRAMUSCULAR; INTRAVENOUS at 10:12

## 2022-04-13 RX ADMIN — OXYCODONE HYDROCHLORIDE 5 MG: 5 SOLUTION ORAL at 10:34

## 2022-04-13 RX ADMIN — CEFAZOLIN 2 G: 330 INJECTION, POWDER, FOR SOLUTION INTRAMUSCULAR; INTRAVENOUS at 08:55

## 2022-04-13 RX ADMIN — DEXAMETHASONE SODIUM PHOSPHATE 4 MG: 4 INJECTION, SOLUTION INTRA-ARTICULAR; INTRALESIONAL; INTRAMUSCULAR; INTRAVENOUS; SOFT TISSUE at 08:55

## 2022-04-13 RX ADMIN — ROCURONIUM BROMIDE 50 MG: 10 INJECTION, SOLUTION INTRAVENOUS at 08:55

## 2022-04-13 RX ADMIN — SUGAMMADEX 200 MG: 100 INJECTION, SOLUTION INTRAVENOUS at 09:33

## 2022-04-13 RX ADMIN — PROPOFOL 200 MG: 10 INJECTION, EMULSION INTRAVENOUS at 08:55

## 2022-04-13 RX ADMIN — SODIUM CHLORIDE, POTASSIUM CHLORIDE, SODIUM LACTATE AND CALCIUM CHLORIDE: 600; 310; 30; 20 INJECTION, SOLUTION INTRAVENOUS at 10:37

## 2022-04-13 RX ADMIN — FENTANYL CITRATE 25 MCG: 50 INJECTION, SOLUTION INTRAMUSCULAR; INTRAVENOUS at 10:09

## 2022-04-13 RX ADMIN — ONDANSETRON 4 MG: 2 INJECTION INTRAMUSCULAR; INTRAVENOUS at 08:55

## 2022-04-13 RX ADMIN — HYDRALAZINE HYDROCHLORIDE 10 MG: 20 INJECTION INTRAMUSCULAR; INTRAVENOUS at 09:21

## 2022-04-13 RX ADMIN — SODIUM CHLORIDE, POTASSIUM CHLORIDE, SODIUM LACTATE AND CALCIUM CHLORIDE: 600; 310; 30; 20 INJECTION, SOLUTION INTRAVENOUS at 08:29

## 2022-04-13 ASSESSMENT — PAIN DESCRIPTION - PAIN TYPE
TYPE: SURGICAL PAIN

## 2022-04-13 NOTE — PROGRESS NOTES
Report called to Stephie ARRIETA. Plan of care discussed. Patient reports tolerable 3-4/10 discomfort in abdomen. No complaints of nausea. Demonstrates proper use of IS. Ice in place. Abdomen CDI. Patient to stage 2 with Padmini BERMUDEZ. Patient states all other needs are met, expresses readiness to discharge.

## 2022-04-13 NOTE — OR SURGEON
Operative Report    Date: 4/13/2022    PreOp Diagnosis:   1.  Periumbilical hernia, incarcerated    PostOp Diagnosis: Same    Procedure(s):  1.  REPAIR, HERNIA, UMBILICAL, ROBOT-ASSISTED, USING DA ESPINOZA XI - Wound Class: Clean    Surgeon(s):  Jason Teague M.D.    First Assist:   CHAITANYA Franklin  (a surgical first assistant was required for the entire procedure for help with patient positioning, prep, incision placement, management of the laparoscopic/robotic camera and equipment, retraction of critical structures, closure of incisions)    Anesthesiologist/Type of Anesthesia:  Anesthesiologist: Tobey Gansert, M.D./General    Surgical Staff:  Circulator: Yesika Reeves R.N.  Relief Circulator: Jose D Sierra R.N.  Relief Scrub: Tosha Andrews  Scrub Person: Sharon Jacobo    Specimens removed if any:  * No specimens in log *    Estimated Blood Loss: 5 mL    Findings: Periumbilical hernia with incarcerated preperitoneal fat    Complications: None noted    Outcome: Transferred to PACU in stable condition    Indications:  56-year-old male presenting with an enlarging and increasingly symptomatic periumbilical hernia for which the procedure above is recommended.  This was discussed with him in detail including the risk, benefits, and alternatives, and he wished to proceed.    Procedure in detail:   The patient was brought to the operating room and was placed in the supine position where general endotracheal anesthesia was induced. SCDs were in place and functioning. Preoperative antibiotics of ancef were given before incision time. The patient's abdomen was prepped and draped in the usual sterile fashion.  Patient was then slightly flexed on the operating table.      After infiltration of local anesthetic, a skin incision was made to accommodate an 8mm robotic port in the left upper quadrant.  A Veress needle was used to access the peritoneum, and after saline drop test, the abdomen was  insufflated to 15 mmHg, which the patient tolerated well.  The robotic camera was then introduced and the abdomen inspected for evidence of trauma secondary to Veress needle or port placement, and found none.     Utilizing the robotic camera with the patient in the slightly flexed position, and after infiltration of local anesthetic, two additional 8-mm left sided ports were inserted; one in the left lateral abdomen, and the other in the left lower quadrant. The robot was brought in and the robotic arms attached.  The camera was positioned, and the working instruments were brought in under direct vision.     Starting the left lateral abdominal wall, the peritoneum was taken down from the left upper quadrant to left lower quadrant and then  from the fascia all the way to the right side of the abdominal wall.  The small umbilical hernia fascial defect was encountered, and contained a small/moderate amount of incarcerated preperitoneal fat.  This was reduced with careful traction using external pressure and the robotic grasper.  The hernia defect measured approximately 1.5 cm.  The fascial edges of all hernia defects were then brought together using a running 0 stratafix barbed suture.  This defect came together well.  Care was taken to avoid tethering of the overlying skin.     A ProGrip mesh trimmed to [id number] was then used to cover the closed defect.  Care was taken to ensure that the mesh laid flat against the abdominal fascia. The result was a adequate, well-positioned mesh surface.  Hemostasis was ensured.  The left sided peritoneal defect was then closed using running 2-0 Monocryl stratafix suture.  This completely covered the Progrip mesh, without any peritoneal defects.     The insufflation and the trochars were removed under direct vision, and hemostasis confirmed.  The skin of all incisions was closed with subcuticular suture.   Dermabond was applied.     All sponge, needle, and instrument counts  were reported as correct at the end of the procedure. The patient tolerated the procedure well and left the operating room for the recovery room in stable and satisfactory condition.      4/13/2022 9:38 AM Jason Teague M.D.

## 2022-04-13 NOTE — OR NURSING
Received from pacu today at 1148. Vss. 4 stab sites with durabond dry and intact. Taking po well.  Dc instructions to wife/patient and verbalized understanding.

## 2022-04-13 NOTE — ANESTHESIA PREPROCEDURE EVALUATION
Case: [id number] Date/Time: 04/13/22 0915    Procedure: REPAIR, HERNIA, UMBILICAL, ROBOT-ASSISTED, USING DA ESPINOZA XI    Pre-op diagnosis: UMBILICAL HERNIA    Location: [facility] OR 11 / SURGERY [facility]    Surgeons: Jason Teague M.D.          Relevant Problems   No relevant active problems       Physical Exam    Airway   Mallampati: II  TM distance: >3 FB  Neck ROM: full       Cardiovascular - normal exam  Rhythm: regular  Rate: normal  (-) murmur     Dental - normal exam           Pulmonary - normal exam  Breath sounds clear to auscultation     Abdominal    Neurological - normal exam                 Anesthesia Plan    ASA 2       Plan - general       Airway plan will be ETT          Induction: intravenous    Postoperative Plan: Postoperative administration of opioids is intended.    Pertinent diagnostic labs and testing reviewed    Informed Consent:    Anesthetic plan and risks discussed with patient.    Use of blood products discussed with: patient whom consented to blood products.

## 2022-04-13 NOTE — ANESTHESIA POSTPROCEDURE EVALUATION
Patient: Robert Anguiano    Procedure Summary     Date: 04/13/22 Room / Location: [place] [address] / SURGERY [facility]    Anesthesia Start: 0851 Anesthesia Stop: 0945    Procedure: REPAIR, HERNIA, UMBILICAL, ROBOT-ASSISTED, USING DA ESPINOZA XI (Abdomen) Diagnosis: (UMBILICAL HERNIA)    Surgeons: Jason Teague M.D. Responsible Provider: Tobey Gansert, M.D.    Anesthesia Type: general ASA Status: 2          Final Anesthesia Type: general  Last vitals  BP   Blood Pressure: 126/63    Temp   36.5 °C (97.7 °F)    Pulse   86   Resp   12    SpO2   91 %      Anesthesia Post Evaluation    Patient location during evaluation: PACU  Patient participation: complete - patient participated  Level of consciousness: awake and alert    Airway patency: patent  Anesthetic complications: no  Cardiovascular status: hemodynamically stable  Respiratory status: acceptable  Hydration status: euvolemic    PONV: none          No complications documented.     Nurse Pain Score: 3 (NPRS)         English

## 2022-04-13 NOTE — ANESTHESIA TIME REPORT
Anesthesia Start and Stop Event Times     Date Time Event    4/13/2022 0827 Ready for Procedure     0851 Anesthesia Start     0945 Anesthesia Stop        Responsible Staff  04/13/22    Name Role Begin End    Tobey Gansert, M.D. Anesth 0851 0945        Overtime Reason:  no overtime (within assigned shift)    Comments:

## 2022-04-13 NOTE — ANESTHESIA PROCEDURE NOTES
Airway    Date/Time: 4/13/2022 8:56 AM  Performed by: Tobey Gansert, M.D.  Authorized by: Tobey Gansert, M.D.     Location:  OR  Urgency:  Elective  Indications for Airway Management:  Anesthesia      Spontaneous Ventilation: absent    Sedation Level:  Deep  Preoxygenated: Yes    Patient Position:  Sniffing  Final Airway Type:  Endotracheal airway  Final Endotracheal Airway:  ETT  Cuffed: Yes    Technique Used for Successful ETT Placement:  Direct laryngoscopy    Insertion Site:  Oral  Blade Type:  Chiquis  Laryngoscope Blade/Videolaryngoscope Blade Size:  3  ETT Size (mm):  8.0  Measured from:  Teeth  ETT to Teeth (cm):  24  Placement Verified by: auscultation and capnometry    Cormack-Lehane Classification:  Grade IIa - partial view of glottis  Number of Attempts at Approach:  1

## 2022-04-28 NOTE — OP REPORT
Operative Report    Date: 4/13/2022    PreOp Diagnosis:   1.  Periumbilical hernia, incarcerated    PostOp Diagnosis: Same    Procedure(s):  1.  REPAIR, HERNIA, UMBILICAL, ROBOT-ASSISTED, USING DA ESPINOZA XI - Wound Class: Clean    Surgeon(s):  Jason Teague M.D.    First Assist:   CHAITANYA Franklin  (a surgical first assistant was required for the entire procedure for help with patient positioning, prep, incision placement, management of the laparoscopic/robotic camera and equipment, retraction of critical structures, closure of incisions)    Anesthesiologist/Type of Anesthesia:  Anesthesiologist: Tobey Gansert, M.D./General    Surgical Staff:  Circulator: Yesika Reeves R.N.  Relief Circulator: Jose D Sierra R.N.  Relief Scrub: Tosha Andrews  Scrub Person: Sharon Jacobo    Specimens removed if any:  * No specimens in log *    Estimated Blood Loss: 5 mL    Findings: Periumbilical hernia with incarcerated preperitoneal fat    Complications: None noted    Outcome: Transferred to PACU in stable condition    Indications:  56-year-old male presenting with an enlarging and increasingly symptomatic periumbilical hernia for which the procedure above is recommended.  This was discussed with him in detail including the risk, benefits, and alternatives, and he wished to proceed.    Procedure in detail:   The patient was brought to the operating room and was placed in the supine position where general endotracheal anesthesia was induced. SCDs were in place and functioning. Preoperative antibiotics of ancef were given before incision time. The patient's abdomen was prepped and draped in the usual sterile fashion.  Patient was then slightly flexed on the operating table.      After infiltration of local anesthetic, a skin incision was made to accommodate an 8mm robotic port in the left upper quadrant.  A Veress needle was used to access the peritoneum, and after saline drop test, the abdomen was  insufflated to 15 mmHg, which the patient tolerated well.  The robotic camera was then introduced and the abdomen inspected for evidence of trauma secondary to Veress needle or port placement, and found none.     Utilizing the robotic camera with the patient in the slightly flexed position, and after infiltration of local anesthetic, two additional 8-mm left sided ports were inserted; one in the left lateral abdomen, and the other in the left lower quadrant. The robot was brought in and the robotic arms attached.  The camera was positioned, and the working instruments were brought in under direct vision.     Starting the left lateral abdominal wall, the peritoneum was taken down from the left upper quadrant to left lower quadrant and then  from the fascia all the way to the right side of the abdominal wall.  The small umbilical hernia fascial defect was encountered, and contained a small/moderate amount of incarcerated preperitoneal fat.  This was reduced with careful traction using external pressure and the robotic grasper.  The hernia defect measured approximately 1.5 cm.  The fascial edges of all hernia defects were then brought together using a running 0 stratafix barbed suture.  This defect came together well.  Care was taken to avoid tethering of the overlying skin.     A ProGrip mesh trimmed to [id number] was then used to cover the closed defect.  Care was taken to ensure that the mesh laid flat against the abdominal fascia. The result was a adequate, well-positioned mesh surface.  Hemostasis was ensured.  The left sided peritoneal defect was then closed using running 2-0 Monocryl stratafix suture.  This completely covered the Progrip mesh, without any peritoneal defects.     The insufflation and the trochars were removed under direct vision, and hemostasis confirmed.  The skin of all incisions was closed with subcuticular suture.   Dermabond was applied.     All sponge, needle, and instrument counts  were reported as correct at the end of the procedure. The patient tolerated the procedure well and left the operating room for the recovery room in stable and satisfactory condition.      4/13/2022 9:38 AM Jason Teague M.D.

## 2023-03-21 NOTE — DISCHARGE INSTRUCTIONS
Hernia Repair Discharge Instructions:    1.  ACTIVITIES: Upon discharge from the hospital, the day of surgery it is requested that you do no significant physical activity and limit mental activities, as you have had sedation. The day after surgery, you may resume activities of daily living, but for four weeks, it is recommended that you do no strenuous activities or heavy lifting (greater than 15 pounds).     2.  DRIVING: You may drive whenever you are off pain medications and are able to perform the activities needed to drive, i.e. turning, bending, twisting, etc.     3.  WOUND: It is not unusual for patients to experience swelling and even bruising at the hernia repair site. With inguinal hernias, sometimes the bruising and swelling may occur at incisions sites, or extend on to the penis or into the scrotum of male patients. This will resolve over the next few days.     4.  ICE: please use ice on the wound to decrease the swelling for the first 24 hours and then discontinue.     5.  BATHING: The dressing can be removed two days after surgery and the wound can then be wetted in a shower as normal, but avoid submersion in water (tub bath) for at least a week.    6.  PAIN MEDICATION: You will be given a prescription for pain medication at discharge. Please take these as directed. It is important to remember not to take medications on an empty stomach as this may cause nausea.     7.  BOWEL FUNCTION: After hernia repair, it is not uncommon for patients to experience constipation. This is due to decreasing activity levels as well as pain medications. You may wish to use a stool softener beginning immediately after surgery, and you may or may not need to use a laxative (Milk of Magnesia, Ex-lax; Senokot, etc.) as well.     8.  CALL IF YOU HAVE: (1) Fevers to more than 1010 F, (2) Unusual chest or leg pain, (3) Drainage or fluid from incision that may be foul smelling, increased tenderness or soreness at the wound or the  wound edges are no longer together,redness or swelling at the incision site. Please do not hesitate to call with any other questions.     9. APPOINTMENT: Contact our office at [phone] for a follow-up appointment in 1 to 2 weeks following your procedure.     If you have any additional questions, please do not hesitate to call the office and speak to either myself or the physician on call.     Office address:  [address] [place], [place] [address]e [facility]. [place], [place] [address]    Jason Teague M.D.  [facility]  [phone]      ACTIVITY: Rest and take it easy for the first 24 hours.  A responsible adult is recommended to remain with you during that time.  It is normal to feel sleepy.  We encourage you to not do anything that requires balance, judgment or coordination.    MILD FLU-LIKE SYMPTOMS ARE NORMAL. YOU MAY EXPERIENCE GENERALIZED MUSCLE ACHES, THROAT IRRITATION, HEADACHE AND/OR SOME NAUSEA.    FOR 24 HOURS DO NOT:  Drive, operate machinery or run household appliances.  Drink beer or alcoholic beverages.   Make important decisions or sign legal documents.      DIET: To avoid nausea, slowly advance diet as tolerated, avoiding spicy or greasy foods for the first day.  Add more substantial food to your diet according to your physician's instructions.  Babies can be fed formula or breast milk as soon as they are hungry.  INCREASE FLUIDS AND FIBER TO AVOID CONSTIPATION.      FOLLOW-UP APPOINTMENT:  A follow-up appointment should be arranged with your doctor; call to schedule.    You should CALL YOUR PHYSICIAN if you develop:  Fever greater than 101 degrees F.  Pain not relieved by medication, or persistent nausea or vomiting.  Excessive bleeding (blood soaking through dressing) or unexpected drainage from the wound.  Extreme redness or swelling around the incision site, drainage of pus or foul smelling drainage.  Inability to urinate or empty your bladder within 8 hours.  Problems with breathing or chest pain.    You should  call 911 if you develop problems with breathing or chest pain.  If you are unable to contact your doctor or surgical center, you should go to the nearest emergency room or urgent care center.  Physician's telephone #: ***    If any questions arise, call your doctor.  If your doctor is not available, please feel free to call the Surgical Center at [phone].     A registered nurse may call you a few days after your surgery to see how you are doing after your procedure.    MEDICATIONS: Resume taking daily medication.  Take prescribed pain medication with food.  If no medication is prescribed, you may take non-aspirin pain medication if needed.  PAIN MEDICATION CAN BE VERY CONSTIPATING.  Take a stool softener or laxative such as senokot, pericolace, or milk of magnesia if needed.    Prescription given for *at [facility]**.  Last pain medication given at *1034**.    If your physician has prescribed pain medication that includes Acetaminophen (Tylenol), do not take additional Acetaminophen (Tylenol) while taking the prescribed medication.    Depression / Suicide Risk    As you are discharged from this [facility] facility, it is important to learn how to keep safe from harming yourself.    Recognize the warning signs:  · Abrupt changes in personality, positive or negative- including increase in energy   · Giving away possessions  · Change in eating patterns- significant weight changes-  positive or negative  · Change in sleeping patterns- unable to sleep or sleeping all the time   · Unwillingness or inability to communicate  · Depression  · Unusual sadness, discouragement and loneliness  · Talk of wanting to die  · Neglect of personal appearance   · Rebelliousness- reckless behavior  · Withdrawal from people/activities they love  · Confusion- inability to concentrate     If you or a loved one observes any of these behaviors or has concerns about self-harm, here's what you can do:  · Talk about it- your  feelings and reasons for harming yourself  · Remove any means that you might use to hurt yourself (examples: pills, rope, extension cords, firearm)  · Get professional help from the community (Mental Health, Substance Abuse, psychological counseling)  · Do not be alone:Call your Safe Contact- someone whom you trust who will be there for you.  · Call your local CRISIS HOTLINE 785-5662 or 405-640-8404  · Call your local Children's Mobile Crisis Response Team [place] (577) 400-4335 or www.Solartrec  · Call the toll free National Suicide Prevention Hotlines   · National Suicide Prevention Lifeline 625-350-XLHE (9914)  · National Hope Line Network 800-SUICIDE (460-4428)   Adbry Pregnancy And Lactation Text: It is unknown if this medication will adversely affect pregnancy or breast feeding.

## (undated) DEVICE — NEEDLE DRIVER MEGA SUTURECUT DA VINCI 15X'S REUSABLE

## (undated) DEVICE — SENSOR SPO2 NEO LNCS ADHESIVE (20/BX) SEE USER NOTES

## (undated) DEVICE — GLOVE BIOGEL INDICATOR SZ 7.5 SURGICAL PF LTX - (50PR/BX 4BX/CA)

## (undated) DEVICE — DRESSING TRANSPARENT FILM TEGADERM 2.375 X 2.75"  (100EA/BX)"

## (undated) DEVICE — COVER TIP ENDOWRIST HOT SHEAR - (10EA/BX) DA VINCI

## (undated) DEVICE — SUTURE 2-0 20CM STRATAFIX SPIRAL SH NEEDLE (12/BX)

## (undated) DEVICE — SET LEADWIRE 5 LEAD BEDSIDE DISPOSABLE ECG (1SET OF 5/EA)

## (undated) DEVICE — SET TUBING PNEUMOCLEAR HIGH FLOW SMOKE EVACUATION (10EA/BX)

## (undated) DEVICE — HEAD HOLDER JUNIOR/ADULT

## (undated) DEVICE — SUCTION INSTRUMENT YANKAUER BULBOUS TIP W/O VENT (50EA/CA)

## (undated) DEVICE — NEEDLE INSFL 120MM 14GA VRRS - (20/BX)

## (undated) DEVICE — NEPTUNE 4 PORT MANIFOLD - (20/PK)

## (undated) DEVICE — MASK ANESTHESIA ADULT  - (100/CA)

## (undated) DEVICE — DERMABOND ADVANCED - (12EA/BX)

## (undated) DEVICE — BIPOLAR FORCE DA VINCI 12X'S REISABLE

## (undated) DEVICE — DRAPE COLUMN  BOX OF 20

## (undated) DEVICE — ELECTRODE DUAL RETURN W/ CORD - (50/PK)

## (undated) DEVICE — SHEARS MONOPOLAR CURVED  DA VINCI 10X'S REUSABLE

## (undated) DEVICE — TOWEL STOP TIMEOUT SAFETY FLAG (40EA/CA)

## (undated) DEVICE — Device

## (undated) DEVICE — CANISTER SUCTION 3000ML MECHANICAL FILTER AUTO SHUTOFF MEDI-VAC NONSTERILE LF DISP  (40EA/CA)

## (undated) DEVICE — ELECTRODE 850 FOAM ADHESIVE - HYDROGEL RADIOTRNSPRNT (50/PK)

## (undated) DEVICE — SET EXTENSION WITH 2 PORTS (48EA/CA) ***PART #2C8610 IS A SUBSTITUTE*****

## (undated) DEVICE — DRAPE ARM  BOX OF 20

## (undated) DEVICE — GLOVE BIOGEL SZ 7.5 SURGICAL PF LTX - (50PR/BX 4BX/CA)

## (undated) DEVICE — SPONGE GAUZESTER. 2X2 4-PL - (2/PK 50PK/BX 30BX/CS)

## (undated) DEVICE — LACTATED RINGERS INJ 1000 ML - (14EA/CA 60CA/PF)

## (undated) DEVICE — KIT ANESTHESIA W/CIRCUIT & 3/LT BAG W/FILTER (20EA/CA)

## (undated) DEVICE — GOWN WARMING STANDARD FLEX - (30/CA)

## (undated) DEVICE — SLEEVE, VASO, THIGH, MED

## (undated) DEVICE — SYSTEM CLEARIFY VISUALIZATION (10EA/PK)

## (undated) DEVICE — SUTURE 4-0 VICRYL PLUS FS-2 - 27 INCH (36/BX)

## (undated) DEVICE — PROTECTOR ULNA NERVE - (36PR/CA)

## (undated) DEVICE — SUTURE GENERAL

## (undated) DEVICE — SEAL 5MM-8MM UNIVERSAL  BOX OF 10

## (undated) DEVICE — SUTURE 0 STRATRFIX SYMMETRIC PDS PLUS 30CM CT-1 (12EA/BX)

## (undated) DEVICE — OBTURATOR BLADELESS STANDARD 8MM (6EA/BX)

## (undated) DEVICE — ROBOTIC SURGERY SERVICES

## (undated) DEVICE — CHLORAPREP 26 ML APPLICATOR - ORANGE TINT(25/CA)

## (undated) DEVICE — TUBING CLEARLINK DUO-VENT - C-FLO (48EA/CA)